# Patient Record
Sex: MALE | Race: WHITE
[De-identification: names, ages, dates, MRNs, and addresses within clinical notes are randomized per-mention and may not be internally consistent; named-entity substitution may affect disease eponyms.]

---

## 2019-02-05 ENCOUNTER — HOSPITAL ENCOUNTER (OUTPATIENT)
Dept: HOSPITAL 61 - SURG | Age: 24
Discharge: HOME | End: 2019-02-05
Attending: ORTHOPAEDIC SURGERY
Payer: COMMERCIAL

## 2019-02-05 VITALS
SYSTOLIC BLOOD PRESSURE: 134 MMHG | SYSTOLIC BLOOD PRESSURE: 134 MMHG | DIASTOLIC BLOOD PRESSURE: 64 MMHG | DIASTOLIC BLOOD PRESSURE: 64 MMHG | SYSTOLIC BLOOD PRESSURE: 134 MMHG | DIASTOLIC BLOOD PRESSURE: 64 MMHG

## 2019-02-05 VITALS — WEIGHT: 190 LBS | BODY MASS INDEX: 25.18 KG/M2 | HEIGHT: 73 IN
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
## 2019-02-05 DIAGNOSIS — S42.021A: Primary | ICD-10-CM
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
## 2019-02-05 DIAGNOSIS — Z79.899: ICD-10-CM

## 2019-02-05 PROCEDURE — A7015 AEROSOL MASK USED W NEBULIZE: HCPCS

## 2019-02-05 PROCEDURE — C1713 ANCHOR/SCREW BN/BN,TIS/BN: HCPCS

## 2019-02-05 PROCEDURE — 23515 OPTX CLAVICULAR FX W/INT FIX: CPT

## 2019-02-05 RX ADMIN — FENTANYL CITRATE PRN MCG: 50 INJECTION INTRAMUSCULAR; INTRAVENOUS at 13:25

## 2019-02-05 RX ADMIN — FENTANYL CITRATE PRN MCG: 50 INJECTION INTRAMUSCULAR; INTRAVENOUS at 12:56

## 2019-02-05 NOTE — DISCH
DISCHARGE INSTRUCTIONS


Condition on Discharge


Condition on Discharge:  Stable





Activity After Discharge


Activity Instructions for Disc:  Other, see below (gentle motion of right 

shoulder along with unrestricted find motor activity as tolerated)


Lifting Instructions after Dis:  No heavy lifting





Diet after Discharge


Diet after Discharge:  Regular





Wound Incision Care


Wound/Incision Care:  Ice to area for comfort, Change dressing (May remove 

dressing in 3 days may then shower no soaking until clinic follow-up)





Contacting the  after DC


Call your doctor for:  Concerns you may have





Follow-Up


Follow up with:  Dr. Roberson 7-10 days











EVELIA ROBERSON MD Feb 5, 2019 11:33

## 2019-02-05 NOTE — PDOC4
Operative Note


Operative Note


Date of surgery: 2/5/2019





Preoperative diagnosis: Displaced midshaft right clavicle fracture





Postoperative diagnosis: Same





Operative procedure: Operative reduction internal fixation right clavicle shaft 

fracture





Surgeon: Da





Anesthesia: Gen.





Estimated blood loss: 25 mL





Complications: None





Operative indications: Akhil is a 23-year-old male that injured his right 

clavicle while playing club hockey for [employer] in a game in [place]. He presented 

to the clinic the weekend after the injury with a large displacement of his 

clavicle fracture and interposed muscle which would make it unlikely to heal. 

We have talked about that as of rationale for operative treatment and the 

unlikely healing based on the large gap with interposed tissue. We talked about 

the possibility of infection delayed healing nerve or blood vessel damage 

medical or other anesthetic competitions among others as well as the 

possibility of hardware prominence. All his questions were answered he wishes 

to proceed with surgical evaluation and treatment.





Operative text: Patient was identified procedure verified patient placed in the 

supine position on the operating table. After adequate amounts of general 

anesthesia were administered he was placed in the beachchair position and the 

right clavicle and shoulder area were prepped and draped in standard sterile 

fashion. After timeout was performed patient procedure identified and verified 

and incision was made over the fracture site subperiosteal dissection was 

carried out to allow mobilization of the clavicle fracture and a appropriately 

curved Acumed clavicle plate was placed superiorly as that most reliably 

stabilized the fracture orientation. The plate was slightly contoured to match 

anatomic alignment and nonlocking screws were Leist on each side of the 

fracture site to initially bring the plate down and achieved fracture reduction 

remaining screws were placed in a locking fashion and the fracture site was 

avoided. A total of 8 cortices were obtained medially and 6 cortices laterally 

with excellent fixation and anatomic reduction noted. Thorough irrigation 

carried out normal saline solution the small fracture fragment was secured to 

the fracture site with #1 Vicryl suture subcutaneous closure was buried Vicryl 

suture subcuticular Monocryl Steri-Strips and Mastisol were applied followed by 

half percent plain Marcaine and sterile dressings were then applied he was 

returned recovery room in stable condition having tolerated procedure well











EVELIA HARRELL MD Feb 5, 2019 13:55

## 2019-03-01 ENCOUNTER — HOSPITAL ENCOUNTER (OUTPATIENT)
Dept: HOSPITAL 61 - KCIC MRI | Age: 24
Discharge: HOME | End: 2019-03-01
Attending: ANESTHESIOLOGY
Payer: COMMERCIAL

## 2019-03-01 DIAGNOSIS — M48.061: ICD-10-CM

## 2019-03-01 DIAGNOSIS — M51.16: Primary | ICD-10-CM

## 2019-03-01 PROCEDURE — 72148 MRI LUMBAR SPINE W/O DYE: CPT

## 2019-03-01 PROCEDURE — 73721 MRI JNT OF LWR EXTRE W/O DYE: CPT

## 2019-03-01 NOTE — [facility]
MRI of the lumbar spine without contrast 3/1/2019

 

CLINICAL HISTORY: Progressive low back pain with right leg pain.

 

TECHNIQUE: Unenhanced T1-weighted and T2-weighted sagittal and axial and 

inversion recovery sagittal images of the lumbar spine were obtained.

 

FINDINGS: Minimal S-shaped curvature of the thoracolumbar spine is seen. 

Degenerative signal changes are seen involving the L4-5 discs. Loss of 

height of this disc is noted. Degenerative signal changes are seen within 

the marrow surrounding this disc. The conus medullaris is within normal 

limits in morphology, position, and signal characteristics.

 

At the L1-2, L2-3 and L3-4 disc spaces there are minimal to mild 

generalized disc bulges. Degenerative changes are seen involving the facet

joints bilaterally. There is mild ligamentum flavum hypertrophy 

bilaterally. These findings when combined do not result in significant 

central spinal canal or neural foraminal stenosis.

 

At the L4-5 disc space there is a mild to moderate generalized disc bulge.

This is eccentric to the right. Degenerative changes are seen involving 

the facet joints bilaterally. There is mild ligamentum flavum hypertrophy 

bilaterally. These findings when combined result in mild right greater 

than left central spinal canal stenosis. No neural foraminal stenosis is 

seen.

 

At the L5-S1 disc space there is a mild generalized disc bulge. 

Degenerative changes are seen involving the facet joints bilaterally. 

There is mild ligamentum flavum hypertrophy bilaterally. These findings 

when combined do not result in significant central spinal canal or neural 

foraminal stenosis.

 

IMPRESSION: The changes of degenerative disc disease are seen involving 

the lumbar spine. These findings result in mild right greater than left 

central spinal canal stenosis at L4-5. No neural foraminal stenosis is 

seen.

 

Electronically signed by: Chase Mullins MD (3/1/2019 5:20 PM) [id number]

## 2019-03-03 NOTE — [facility]
Examination: MRI of the right hip without contrast

 

HISTORY: History of right hip pain

 

COMPARISON: None available 

 

Technique: Multiplanar, sequence MR imaging of the right hip were 

performed without contrast.

 

FINDINGS:

 

 

The right femoral head is within the acetabulum. There is no acute 

fracture identified.

 

The attachment of the hamstring tendons to the ischial tuberosity, 

attachment of the iliopsoas tendon to the lesser trochanter, attachment of

the rectus femoris tendon to the anterior inferior iliac spine, attachment

of the gluteal tendons to the greater trochanter grossly appears intact. 

There is a faint subtle increased T2 signal identified in the posterior 

superior labrum, best visualized on series 8 image #8 likely a subtle 

labral tear.

 

The visualized heart size grossly appears unremarkable. The visualized 

sciatic nerve grossly appears unremarkable.

 

 

 

IMPRESSION:

 

Faint subtle increased T2 signal identified in the posterior superior 

labrum could be a subtle labral tear. This can be better evaluated with MR

arthrogram.

 

Electronically signed by: Claus Medley MD (3/3/2019 9:39 AM) [facility]

## 2019-11-21 ENCOUNTER — HOSPITAL ENCOUNTER (OUTPATIENT)
Dept: HOSPITAL 61 - RAD | Age: 24
Discharge: HOME | End: 2019-11-21
Attending: FAMILY MEDICINE
Payer: COMMERCIAL

## 2019-11-21 DIAGNOSIS — R06.02: Primary | ICD-10-CM

## 2019-11-21 PROCEDURE — 71046 X-RAY EXAM CHEST 2 VIEWS: CPT

## 2019-11-21 NOTE — RAD
EXAM: Chest, 2 views.

 

HISTORY: Shortness of breath.

 

COMPARISON: None.

 

FINDINGS: 2 views of the chest are obtained. There is no infiltrate, 

pleural effusion or pneumothorax. The heart is normal in size. There is 

internal fixation of the right clavicle.

 

IMPRESSION: No acute pulmonary finding.

 

Electronically signed by: Rosanne Hopson MD (11/21/2019 3:19 PM) [place] [id number]

## 2020-07-18 ENCOUNTER — HOSPITAL ENCOUNTER (EMERGENCY)
Dept: HOSPITAL 61 - ER | Age: 25
Discharge: HOME | End: 2020-07-18
Payer: COMMERCIAL

## 2020-07-18 VITALS
DIASTOLIC BLOOD PRESSURE: 93 MMHG | SYSTOLIC BLOOD PRESSURE: 147 MMHG | DIASTOLIC BLOOD PRESSURE: 93 MMHG | SYSTOLIC BLOOD PRESSURE: 147 MMHG

## 2020-07-18 VITALS — BODY MASS INDEX: 25.68 KG/M2 | WEIGHT: 189.6 LBS | HEIGHT: 72 IN
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
## 2020-07-18 DIAGNOSIS — S82.892A: Primary | ICD-10-CM
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
## 2020-07-18 DIAGNOSIS — R60.0: ICD-10-CM

## 2020-07-18 PROCEDURE — 73610 X-RAY EXAM OF ANKLE: CPT

## 2020-07-18 PROCEDURE — 99283 EMERGENCY DEPT VISIT LOW MDM: CPT

## 2020-07-18 PROCEDURE — 29515 APPLICATION SHORT LEG SPLINT: CPT

## 2020-07-18 NOTE — RAD
ANKLE LEFT 3V 7/18/2020 1:17 PM

 

INDICATION: Left lateral ankle pain after vessel injury

 

COMPARISON: None available.

 

TECHNIQUE:  3 views of the left ankle are provided.

 

FINDINGS/

IMPRESSION:

There is a transversely oriented, mildly displaced and comminuted fracture

through the lateral malleolus with extension to the ankle mortise. Tibial 

plafond and talar dome are intact. Regional soft tissue swelling is noted 

predominantly along the lateral ankle. Talus is intact.

 

Electronically signed by: Francy Carreno MD (7/18/2020 1:53 PM) 

[facility]

## 2020-07-18 NOTE — PHYS DOC
General Adult


EDM:


Chief Complaint:  ANKLE PROBLEM





HPI:


HPI:





Patient is a 25  year old male who presents with playing basketball last night 

when he rolled his left ankle.  Patient has left lateral ankle pain and swelling

2+.  There is no bruising or deformity is seen.  Patient states he can put some 

weight on it but is very painful.  He denies any numbness or tingling, skin 

color change, skin temperature change.  He has very limited range of motion 

ankle due to pain and some swelling.  Patient can wiggle all of his toes.  There

is no tenderness to the tibia-fibula.  Rates his pain a 2 out of 10 and states 

is nonradiating at this time.





Review of Systems:


Review of Systems:


Constitutional:   Denies fever or chills. []


Eyes:   Denies change in visual acuity. []


HENT:   Denies nasal congestion or sore throat. [] 


Respiratory:   Denies cough or shortness of breath. [] 


Cardiovascular:   Denies chest pain. Left lateral ankle 2+ edema. [] 


GI:   Denies abdominal pain, nausea, vomiting, bloody stools or diarrhea. [] 


:  Denies dysuria. [] 


Musculoskeletal:   Denies back pain. Left lateral anklejoint pain. [] 


Integument:   Denies rash. [] 


Neurologic:   Denies headache, focal weakness or sensory changes. [] 


Endocrine:   Denies polyuria or polydipsia. [] 


Lymphatic:  Denies swollen glands. [] 


Psychiatric:  Denies depression or anxiety. []





Heart Score:


Risk Factors:


Risk Factors:  DM, Current or recent (<one month) smoker, HTN, HLP, family 

history of CAD, obesity.


Risk Scores:


Score 0 - 3:  2.5% MACE over next 6 weeks - Discharge Home


Score 4 - 6:  20.3% MACE over next 6 weeks - Admit for Clinical Observation


Score 7 - 10:  72.7% MACE over next 6 weeks - Early Invasive Strategies





Allergies:


Allergies:





Allergies








Coded Allergies Type Severity Reaction Last Updated Verified


 


  No Known Drug Allergies    19 No











Physical Exam:


PE:





Constitutional: Well developed, well nourished, no acute distress, non-toxic 

appearance. []


HENT: Normocephalic, atraumatic, bilateral external ears normal, oropharynx 

moist, no oral exudates, nose normal. []


Eyes: PERRLA, EOMI, conjunctiva normal, no discharge. [] 


Neck: Normal range of motion, no tenderness, supple, no stridor. [] 


Cardiovascular:Heart rate regular rhythm, no murmur []


Lungs & Thorax:  Bilateral breath sounds clear to auscultation []


Abdomen: Bowel sounds normal, soft, no tenderness, no masses, no pulsatile 

masses. [] 


Skin: Warm, dry, no erythema, no rash. [] 


Back: No tenderness, no CVA tenderness. [] 


Extremities: Left lateral ankle tenderness, no cyanosis, no clubbing, Left ankle

ROM limited intact, 2+ edema. [] 


Neurologic: Alert and oriented X 3, normal motor function, normal sensory 

function, no focal deficits noted. []


Psychologic: Affect normal, judgement normal, mood normal. []





EKG:


EKG:


[]





Radiology/Procedures:


Radiology/Procedures:


[]


Impression:


                            [facility]


                    [address] [place] [address]


                                 [phone]


                                        


                                 IMAGING REPORT





                                     Signed





PATIENT: CONCHITA QUEEN  ACCOUNT: [id number]     MRN#: [id number]


: 1995           LOCATION: ER              AGE: 25


SEX: M                    EXAM DT: 20         ACCESSION#: [id number]


STATUS: REG ER            ORD. PHYSICIAN: SOFIA AGUILAR DO


REASON: left lateral ankle pain after basketball injury


PROCEDURE: ANKLE LEFT 3V





ANKLE LEFT 3V 2020 1:17 PM


 


INDICATION: Left lateral ankle pain after vessel injury


 


COMPARISON: None available.


 


TECHNIQUE:  3 views of the left ankle are provided.


 


FINDINGS/


IMPRESSION:


There is a transversely oriented, mildly displaced and comminuted fracture


through the lateral malleolus with extension to the ankle mortise. Tibial 


plafond and talar dome are intact. Regional soft tissue swelling is noted 


predominantly along the lateral ankle. Talus is intact.


 


Electronically signed by: Shayy Solorzano MD (2020 1:53 PM) 


[facility]














DICTATED and SIGNED BY:     SHAYY SOLORZANO MD


DATE:     20 0101





Course & Med Decision Making:


Course & Med Decision Making


Pertinent Labs and Imaging studies reviewed. (See chart for details)





See HPI. Patient is placed in a posterior splint and to be nonweight bearing. 

Patient will receive crutches. I have gone over results with Dr Aguilar on this

 patient. No joint laxity. 





***Splint assessment: Neurovascularly intact post splint replacement with good 

fit.





Patient's extremity symptoms have stabilized well they have been evaluated in 

the department and are appropriate for outpatient follow-up.  No evidence of 

compartment syndrome, neurologic injury, vascular injury, open joint, open 

fracture, tendon laceration, or foreign body.





[]





Dragon Disclaimer:


Dragon Disclaimer:


This electronic medical record was generated, in whole or in part, using a voice

 recognition dictation system.





Departure


Departure


Impression:  


   Primary Impression:  


   Malleolar fracture


   Qualified Codes:  S82.892A - Other fracture of left lower leg, initial 

   encounter for closed fracture


Disposition:   HOME, SELF-CARE


Condition:  STABLE


Referrals:  


CARLEE LINTON MD (PCP)








EVELIA ROBERSON MD


Patient Instructions:  Ankle Fracture, Crutch Use, Easy-to-Read





Additional Instructions:  


Keep the extremity iced and elevated to help with pain and swelling.  Use the 

crutches and remain nonweightbearing.  Follow-up with Dr. Roberson as soon as 

possible.  Take medication as prescribed for your pain.


Scripts


Hydrocodone/Apap 5-325 (NORCO 5-325 TABLET) 1 Each Tablet


1 TAB PO PRN Q6HRS PRN for PAIN, #8 TAB 0 Refills


   Prov: KATRIN FRANKLIN         20 


Ibuprofen (IBUPROFEN) 600 Mg Tablet


600 MG PO PRN Q6HRS PRN for INFLAMMATION, #20 TAB


   Prov: KATRIN FRANKLIN         20





Justicifation of Admission Dx:


Justifications for Admission:


Justification of Admission Dx:  N/A











KATRIN FRANKLIN            2020 14:06

## 2021-12-16 ENCOUNTER — HOSPITAL ENCOUNTER (OUTPATIENT)
Dept: HOSPITAL 61 - US | Age: 26
Discharge: HOME | End: 2021-12-16
Attending: ANESTHESIOLOGY
Payer: COMMERCIAL

## 2021-12-16 DIAGNOSIS — Z72.89: ICD-10-CM

## 2021-12-16 DIAGNOSIS — Z79.899: ICD-10-CM

## 2021-12-16 DIAGNOSIS — R10.13: Primary | ICD-10-CM

## 2021-12-16 PROCEDURE — 76700 US EXAM ABDOM COMPLETE: CPT

## 2021-12-16 NOTE — RAD
EXAMINATION: US ABDOMEN COMPLETE.



Site ID: [id number]



HISTORY: 26 years  Male  Reason: EPIGASTRIC ABDOMINAL PAIN / Spl. Instructions:  / History: . .



COMPARISON:  None.



TECHNIQUE: Ultrasound imaging of the liver, gallbladder, biliary tract, spleen, pancreas, kidneys, ao
rta, and IVC was performed.  



FINDINGS:



The pancreas is largely obscured. 



The liver is 15.5 craniocaudally.  Echogenicity of the hepatic parenchyma is within normal limits.  N
o focal liver masses are identified.  The portal vein is patent and demonstrates appropriate directio
n of flow.



The CBD caliber is not well demonstrated.



The gallbladder demonstrates no stones, wall thickening or pericholecystic fluid. Sonographic Coleman 
sign is reportedly negative.. 

 

The right kidney measures 13.1, and the left kidney measures 13.6 in length.  No hydronephrosis.  No 
focal mass.



The spleen measures 12, normal.



The abdominal aorta and IVC visualized portions appear grossly unremarkable.



No significant fluid collection or ascites.



IMPRESSION:

Unremarkable exam.



Electronically signed by: Riley Zhao MD (12/16/2021 10:02 AM) [id number]